# Patient Record
Sex: FEMALE | Race: WHITE
[De-identification: names, ages, dates, MRNs, and addresses within clinical notes are randomized per-mention and may not be internally consistent; named-entity substitution may affect disease eponyms.]

---

## 2018-06-20 ENCOUNTER — HOSPITAL ENCOUNTER (OUTPATIENT)
Dept: HOSPITAL 89 - RESP | Age: 78
End: 2018-06-20
Attending: INTERNAL MEDICINE
Payer: MEDICARE

## 2018-06-20 VITALS — BODY MASS INDEX: 25.24 KG/M2

## 2018-06-20 DIAGNOSIS — J45.20: Primary | ICD-10-CM

## 2018-06-20 DIAGNOSIS — R09.02: ICD-10-CM

## 2018-06-20 PROCEDURE — 94060 EVALUATION OF WHEEZING: CPT

## 2018-06-20 PROCEDURE — 94729 DIFFUSING CAPACITY: CPT

## 2018-06-20 PROCEDURE — 94726 PLETHYSMOGRAPHY LUNG VOLUMES: CPT

## 2018-07-11 ENCOUNTER — HOSPITAL ENCOUNTER (OUTPATIENT)
Dept: HOSPITAL 89 - RESP | Age: 78
End: 2018-07-11
Attending: INTERNAL MEDICINE
Payer: MEDICARE

## 2018-07-11 VITALS — BODY MASS INDEX: 25.24 KG/M2

## 2018-07-11 DIAGNOSIS — G47.30: Primary | ICD-10-CM

## 2018-07-11 DIAGNOSIS — G47.36: ICD-10-CM

## 2018-10-02 ENCOUNTER — HOSPITAL ENCOUNTER (OUTPATIENT)
Dept: HOSPITAL 89 - CARD | Age: 78
LOS: 6 days | End: 2018-10-08
Attending: INTERNAL MEDICINE
Payer: MEDICARE

## 2018-10-02 VITALS — BODY MASS INDEX: 25.24 KG/M2

## 2018-10-02 DIAGNOSIS — R09.02: ICD-10-CM

## 2018-10-02 DIAGNOSIS — J45.20: Primary | ICD-10-CM

## 2018-10-02 PROCEDURE — 94618 PULMONARY STRESS TESTING: CPT

## 2018-10-02 PROCEDURE — 94667 MNPJ CHEST WALL 1ST: CPT

## 2019-01-08 ENCOUNTER — HOSPITAL ENCOUNTER (OUTPATIENT)
Dept: HOSPITAL 89 - LAB | Age: 79
End: 2019-01-08
Attending: SURGERY
Payer: MEDICARE

## 2019-01-08 VITALS — BODY MASS INDEX: 25.24 KG/M2

## 2019-01-08 DIAGNOSIS — D50.9: ICD-10-CM

## 2019-01-08 DIAGNOSIS — I48.91: Primary | ICD-10-CM

## 2019-01-08 LAB — PLATELET COUNT, AUTOMATED: 285 K/UL (ref 150–450)

## 2019-01-08 PROCEDURE — 82310 ASSAY OF CALCIUM: CPT

## 2019-01-08 PROCEDURE — 82374 ASSAY BLOOD CARBON DIOXIDE: CPT

## 2019-01-08 PROCEDURE — 85025 COMPLETE CBC W/AUTO DIFF WBC: CPT

## 2019-01-08 PROCEDURE — 84132 ASSAY OF SERUM POTASSIUM: CPT

## 2019-01-08 PROCEDURE — 82947 ASSAY GLUCOSE BLOOD QUANT: CPT

## 2019-01-08 PROCEDURE — 82565 ASSAY OF CREATININE: CPT

## 2019-01-08 PROCEDURE — 36415 COLL VENOUS BLD VENIPUNCTURE: CPT

## 2019-01-08 PROCEDURE — 84295 ASSAY OF SERUM SODIUM: CPT

## 2019-01-08 PROCEDURE — 84520 ASSAY OF UREA NITROGEN: CPT

## 2019-01-08 PROCEDURE — 82435 ASSAY OF BLOOD CHLORIDE: CPT

## 2019-01-09 NOTE — EKG
FACILITY: [facility] 

 

PATIENT NAME: KHUSHBU POON

: [id number]

MR: [id number]

V: [id number]

EXAM DATE: 

ORDERING PHYSICIAN: ELIAS ARIAS

TECHNOLOGIST: TOVA

 

Test Reason : SURGERY CLEARANCE

Blood Pressure : ***/*** mmHG

Vent. Rate : 081 BPM     Atrial Rate : 081 BPM

   P-R Int : 208 ms          QRS Dur : 084 ms

    QT Int : 388 ms       P-R-T Axes : 067 -12 061 degrees

   QTc Int : 450 ms

 

Normal sinus rhythm

Normal ECG

When compared with ECG of 31-OCT-2017 05:45,

Criteria for Septal infarct are no longer present

Borderline criteria for Lateral infarct are no longer present

Nonspecific T wave abnormality no longer evident in Anterior leads

Confirmed by HOMA SALOMON ([id number]) on 1/10/2019 1:48:17 PM

 

Referred By:  HUGO           Confirmed By:HOMA SALOMON

## 2019-02-05 ENCOUNTER — HOSPITAL ENCOUNTER (OUTPATIENT)
Dept: HOSPITAL 89 - OR | Age: 79
Discharge: HOME | End: 2019-02-05
Attending: SURGERY
Payer: MEDICARE

## 2019-02-05 VITALS — DIASTOLIC BLOOD PRESSURE: 79 MMHG | SYSTOLIC BLOOD PRESSURE: 150 MMHG

## 2019-02-05 VITALS — SYSTOLIC BLOOD PRESSURE: 128 MMHG | DIASTOLIC BLOOD PRESSURE: 92 MMHG

## 2019-02-05 VITALS — DIASTOLIC BLOOD PRESSURE: 77 MMHG | SYSTOLIC BLOOD PRESSURE: 145 MMHG

## 2019-02-05 VITALS — SYSTOLIC BLOOD PRESSURE: 119 MMHG | DIASTOLIC BLOOD PRESSURE: 57 MMHG

## 2019-02-05 VITALS — DIASTOLIC BLOOD PRESSURE: 71 MMHG | SYSTOLIC BLOOD PRESSURE: 137 MMHG

## 2019-02-05 VITALS — SYSTOLIC BLOOD PRESSURE: 153 MMHG | DIASTOLIC BLOOD PRESSURE: 83 MMHG

## 2019-02-05 VITALS — DIASTOLIC BLOOD PRESSURE: 71 MMHG | SYSTOLIC BLOOD PRESSURE: 130 MMHG

## 2019-02-05 VITALS
WEIGHT: 139 LBS | HEIGHT: 62 IN | WEIGHT: 139 LBS | HEIGHT: 62 IN | BODY MASS INDEX: 25.58 KG/M2 | BODY MASS INDEX: 25.58 KG/M2

## 2019-02-05 VITALS — SYSTOLIC BLOOD PRESSURE: 125 MMHG | DIASTOLIC BLOOD PRESSURE: 63 MMHG

## 2019-02-05 DIAGNOSIS — D12.3: ICD-10-CM

## 2019-02-05 DIAGNOSIS — K44.9: ICD-10-CM

## 2019-02-05 DIAGNOSIS — K57.30: ICD-10-CM

## 2019-02-05 DIAGNOSIS — K29.70: Primary | ICD-10-CM

## 2019-02-05 DIAGNOSIS — D64.9: ICD-10-CM

## 2019-02-05 PROCEDURE — 88344 IMHCHEM/IMCYTCHM EA MLT ANTB: CPT

## 2019-02-05 PROCEDURE — 45380 COLONOSCOPY AND BIOPSY: CPT

## 2019-02-05 PROCEDURE — 87077 CULTURE AEROBIC IDENTIFY: CPT

## 2019-02-05 PROCEDURE — 88305 TISSUE EXAM BY PATHOLOGIST: CPT

## 2019-02-05 PROCEDURE — 88313 SPECIAL STAINS GROUP 2: CPT

## 2019-02-05 PROCEDURE — 43245 EGD DILATE STRICTURE: CPT

## 2019-02-05 PROCEDURE — 43239 EGD BIOPSY SINGLE/MULTIPLE: CPT

## 2019-02-05 PROCEDURE — 00813 ANES UPR LWR GI NDSC PX: CPT

## 2019-02-05 NOTE — SHORT(OUTPT) DISCHARGE SUMMARY
Discharge Summary


Reason for Hosp/Final Diag:  


(1) Iron deficiency anemia


Hospital Course & Plan:  79 yo f presented for egd and colonoscopy. she 


tolerated the procedures well. no complications. path pending. she will be 


discharged home when criteria met. 





Departure


Discharge to:  Home





Discharge Instructions


Home Meds


Active Scripts


Ferrous Sulfate (IRON) 325 Mg Tablet, 1 CAP PO QDAY, #60 TAB 2 Refills


   Prov:PRO CALI MD         1/2/19


Montelukast Sodium (SINGULAIR) 10 Mg Tablet, 1 TAB PO QDAY, #90 TAB 2 Refills


   Prov:PRO CALI MD         8/7/18


Diltiazem Hcl (DILTIAZEM 24HR CD) 300 Mg Cap.er.24h, 1 CAP PO QDAY, #90 CAP 3 


Refills


   Prov:PRO CALI MD         6/14/18


Albuterol Sulfate 90 Mcg/Act (PROAIR HFA 90 MCG/ACT) 8.5 Gm Hfa.aer.ad, 1-2 PUFF


IH 3-4XD PRN for shortness of breath, #3 CART 4 Refills


   Prov:PRO ACLI MD         6/14/18


Atorvastatin Calcium (LIPITOR) 10 Mg Tablet, 1 TAB PO QDAY, #90 TAB 3 Refills


   Prov:PRO CALI MD         2/21/18


Rivaroxaban 20 Mg (XARELTO 20 MG) 20 Mg Tablet, 1 TAB PO QDAY, #90 TAB 3 Refills


   Prov:PRO CALI MD         2/21/18


Reported Medications


Oxygen (OXYGEN)  Inha, 2 L INH cont,  


   7/19/18


Ketoconazole (KETOCONAZOLE) 15 Gm Cream..g., 15 GM TP BID, #15 TUBE


   12/4/17


Omeprazole (OMEPRAZOLE) 20 Mg Capsule.dr, 1 CAP PO QDAY, CAP


   11/8/17


Fexofenadine Hcl (ALLEGRA ALLERGY) 180 Mg Tablet, 1 TAB-CAP PO QDAY, TAB


   8/1/16


Diphenhydramine Hcl (DIPHENHYDRAMINE HCL) 25 Mg Tablet, 1 TAB PO QHS, TAB


   5/2/16


Triamcinolone Acetonide (Nasacort) 10.8 Ml Spray, 1 SPR NA QDAY PRN for allergy 


symptoms


   10/22/15


Cranberry Fruit (CRANBERRY) 475 Mg Capsule, 1 CAP PO QDAY, CAPSULE


   10/22/15


Calcium Carb & Cit/Vitamin D3 (CALCIUM + D3 ER TABLET) 1 Each Tablet.er, 1 TAB 


PO QDAY


   9/12/14


Flaxseed (FLAXSEED OIL) 1,000 Mg Capsule, 1 CAP PO QDAY, CAPSULE


   9/12/14


Diet:  Regular


Activity:  As Tolerated


Special Instructions:  


we will call you in 7-10 days with biopsy results.











ELIAS ARIAS                   Feb 5, 2019 08:57

## 2019-04-12 ENCOUNTER — HOSPITAL ENCOUNTER (OUTPATIENT)
Dept: HOSPITAL 89 - LAB | Age: 79
End: 2019-04-12
Attending: NURSE PRACTITIONER
Payer: MEDICARE

## 2019-04-12 VITALS — BODY MASS INDEX: 25.24 KG/M2

## 2019-04-12 DIAGNOSIS — D50.9: Primary | ICD-10-CM

## 2019-04-12 LAB — PLATELET COUNT, AUTOMATED: 250 K/UL (ref 150–450)

## 2019-04-12 PROCEDURE — 82728 ASSAY OF FERRITIN: CPT

## 2019-04-12 PROCEDURE — 85025 COMPLETE CBC W/AUTO DIFF WBC: CPT

## 2019-04-12 PROCEDURE — 83550 IRON BINDING TEST: CPT

## 2019-04-12 PROCEDURE — 36415 COLL VENOUS BLD VENIPUNCTURE: CPT

## 2019-04-12 PROCEDURE — 83540 ASSAY OF IRON: CPT

## 2019-04-17 ENCOUNTER — HOSPITAL ENCOUNTER (EMERGENCY)
Dept: HOSPITAL 89 - ER | Age: 79
Discharge: HOME | End: 2019-04-17
Payer: MEDICARE

## 2019-04-17 VITALS — BODY MASS INDEX: 25.24 KG/M2 | WEIGHT: 139 LBS

## 2019-04-17 VITALS — DIASTOLIC BLOOD PRESSURE: 60 MMHG | SYSTOLIC BLOOD PRESSURE: 144 MMHG

## 2019-04-17 DIAGNOSIS — R05: ICD-10-CM

## 2019-04-17 DIAGNOSIS — J20.9: Primary | ICD-10-CM

## 2019-04-17 LAB — PLATELET COUNT, AUTOMATED: 214 K/UL (ref 150–450)

## 2019-04-17 PROCEDURE — 82374 ASSAY BLOOD CARBON DIOXIDE: CPT

## 2019-04-17 PROCEDURE — 71046 X-RAY EXAM CHEST 2 VIEWS: CPT

## 2019-04-17 PROCEDURE — 84520 ASSAY OF UREA NITROGEN: CPT

## 2019-04-17 PROCEDURE — 82247 BILIRUBIN TOTAL: CPT

## 2019-04-17 PROCEDURE — 84460 ALANINE AMINO (ALT) (SGPT): CPT

## 2019-04-17 PROCEDURE — 93005 ELECTROCARDIOGRAM TRACING: CPT

## 2019-04-17 PROCEDURE — 94640 AIRWAY INHALATION TREATMENT: CPT

## 2019-04-17 PROCEDURE — 82947 ASSAY GLUCOSE BLOOD QUANT: CPT

## 2019-04-17 PROCEDURE — 99284 EMERGENCY DEPT VISIT MOD MDM: CPT

## 2019-04-17 PROCEDURE — 96374 THER/PROPH/DIAG INJ IV PUSH: CPT

## 2019-04-17 PROCEDURE — 82565 ASSAY OF CREATININE: CPT

## 2019-04-17 PROCEDURE — 84132 ASSAY OF SERUM POTASSIUM: CPT

## 2019-04-17 PROCEDURE — 82310 ASSAY OF CALCIUM: CPT

## 2019-04-17 PROCEDURE — 84484 ASSAY OF TROPONIN QUANT: CPT

## 2019-04-17 PROCEDURE — 83605 ASSAY OF LACTIC ACID: CPT

## 2019-04-17 PROCEDURE — 82040 ASSAY OF SERUM ALBUMIN: CPT

## 2019-04-17 PROCEDURE — 85025 COMPLETE CBC W/AUTO DIFF WBC: CPT

## 2019-04-17 PROCEDURE — 84155 ASSAY OF PROTEIN SERUM: CPT

## 2019-04-17 PROCEDURE — 83880 ASSAY OF NATRIURETIC PEPTIDE: CPT

## 2019-04-17 PROCEDURE — 84295 ASSAY OF SERUM SODIUM: CPT

## 2019-04-17 PROCEDURE — 84075 ASSAY ALKALINE PHOSPHATASE: CPT

## 2019-04-17 PROCEDURE — 84450 TRANSFERASE (AST) (SGOT): CPT

## 2019-04-17 PROCEDURE — 82435 ASSAY OF BLOOD CHLORIDE: CPT

## 2019-04-17 NOTE — EKG
FACILITY: [facility] 

 

PATIENT NAME: KHUSHBU POON

: [id number]

MR: [id number]

V: [id number]

EXAM DATE: 

ORDERING PHYSICIAN: JONATHAN CASTELLANOS

TECHNOLOGIST: YUE

 

Test Reason : DYSPNEA

Blood Pressure : ***/*** mmHG

Vent. Rate : 064 BPM     Atrial Rate : 064 BPM

   P-R Int : 208 ms          QRS Dur : 082 ms

    QT Int : 428 ms       P-R-T Axes : 057 -19 028 degrees

   QTc Int : 441 ms

 

Sinus rhythm

Septal infarct , age undetermined

Abnormal ECG

When compared with ECG of 2019 15:17,

Septal infarct is now present

Confirmed by KATHY MENJIVAR ([id number]) on 2019 6:46:17 AM

 

Referred By:             Confirmed By:KATHY MENJIVAR

## 2019-04-17 NOTE — RADIOLOGY IMAGING REPORT
FACILITY: [facility] 

 

PATIENT NAME: Tanna Saavedra

: 1940

MR: [id number]

V: [id number]

EXAM DATE: 

ORDERING PHYSICIAN: JONATHAN CASTELLANOS

TECHNOLOGIST: 

 

Location: [facility]

Patient: Tanna Saavedra

: 1940

MRN: [id number]

Visit/Account:[id number]

Date of Sevice:  2019

 

ACCESSION #: [id number]

 

TWO VIEW CHEST 2019 4:03 AM.

 

INDICATION: Respiratory distress.

 

COMPARISON: 10/31/2017.

 

FINDINGS: Lungs are well-expanded.  The lungs are clear.  No pneumothorax or pleural effusion.  Borde
rline enlargement of cardiac silhouette.

 

IMPRESSION: Borderline enlargement of the cardiac silhouette, otherwise nonacute.

 

Report Dictated By: Hermann Ren MD at 2019 5:15 AM

 

Report E-Signed By: Hermann Ren MD  at 2019 5:16 AM

 

WSN:[id number]

## 2019-04-17 NOTE — ER REPORT
History and Physical


Time Seen By MD:  03:40


HPI/ROS


CHIEF COMPLAINT: Cough, shortness of breath





HISTORY OF PRESENT ILLNESS: 79-year-old female presents to the ER complaining of


persistent cough.  Patient was seen by her primary care physician 5 days ago and


diagnosed with viral URI after 24 hours of symptoms.  Her symptoms have 


persisted and become much worse.  Tonight she is having coughing fits and unable


to catch her breath.  She's even feeling a little syncopal but has not passed 


out.  She notes no fever.  She's been having some productive cough of yellow 


sputum.  She's been taking significant over-the-counter medications to suppress 


her cough without improvement.  She's been eating cough drops to excess.  


Patient notes no chest pain or leg swelling.  Patient's been using her albuterol


inhaler without improvement.  She has Symbicort, but has been not been taking 


it.  Patient has history of allergies and asthma.





REVIEW OF SYSTEMS:


Respiratory: As above


Cardiovascular: No chest pain, no palpitations.


Gastrointestinal: No vomiting, no abdominal pain.


Musculoskeletal: No back pain.


Allergies:  


Coded Allergies:  


     adhesive tape (Verified  Allergy, Mild, 19)


     codeine (Verified  Allergy, Mild, RASH, 19)


     erythromycin base (Verified  Allergy, Mild, RASH, 19)


     penicillin G (Verified  Allergy, Mild, RASH, 19)


     BEE STINGS (Unverified  Allergy, Unknown, hives, 19)


     Iodinated Contrast- Oral and IV Dye (Unverified  Allergy, Unknown, sea food


allergy, 19)


Uncoded Allergies:  


     PAIN MEDICATIONS (Allergy, Mild, RASH, 07)


     SEAFOOD (Allergy, Mild, HIVES, 07)


Home Meds


Active Scripts


Cefuroxime Axetil (CEFUROXIME) 500 Mg Tablet, 500 MG PO BID for infection, #14 


TAB


   Prov:JONATHAN CASTELLANOS DO         19


Benzonatate 100 Mg Cap (TESSALON PERLE 100 MG CAP) 100 Mg Capsule, 100 MG PO TID


PRN for cough suppression, #20 CAP


   Prov:JONATHAN CASTELLANOS DO         19


Atorvastatin Calcium (LIPITOR) 10 Mg Tablet, 1 TAB PO QDAY, #90 TAB 0 Refills


   Prov:SYLVIA AMBROSE FNP-C         19


Rivaroxaban 20 Mg (XARELTO 20 MG) 20 Mg Tablet, 1 TAB PO QDAY, #90 TAB 0 Refills


   Prov:HALIESYLVIA KEITH LEE-C         19


Ferrous Sulfate (IRON) 325 Mg Tablet, 1 CAP PO QDAY, #60 TAB 2 Refills


   Prov:PRO CALI MD         19


Montelukast Sodium (SINGULAIR) 10 Mg Tablet, 1 TAB PO QDAY, #90 TAB 2 Refills


   Prov:PRO CALI MD         18


Diltiazem Hcl (DILTIAZEM 24HR CD) 300 Mg Cap.er.24h, 1 CAP PO QDAY, #90 CAP 3 


Refills


   Prov:PRO CALI MD         18


Albuterol Sulfate 90 Mcg/Act (PROAIR HFA 90 MCG/ACT) 8.5 Gm Hfa.aer.ad, 1-2 PUFF


IH 3-4XD PRN for shortness of breath, #3 CART 4 Refills


   Prov:PRO CALI MD         18


Reported Medications


Oxygen (OXYGEN)  Inha, 2 L INH cont,  


   18


Ketoconazole (KETOCONAZOLE) 15 Gm Cream..g., 15 GM TP BID, #15 TUBE


   17


Omeprazole (OMEPRAZOLE) 20 Mg Capsule.dr, 1 CAP PO QDAY, CAP


   17


Fexofenadine Hcl (ALLEGRA ALLERGY) 180 Mg Tablet, 1 TAB-CAP PO QDAY, TAB


   16


Diphenhydramine Hcl (DIPHENHYDRAMINE HCL) 25 Mg Tablet, 1 TAB PO QHS, TAB


   16


Triamcinolone Acetonide (Nasacort) 10.8 Ml Spray, 1 SPR NA QDAY PRN for allergy 


symptoms


   10/22/15


Cranberry Fruit (CRANBERRY) 475 Mg Capsule, 1 CAP PO QDAY, CAPSULE


   10/22/15


Calcium Carb & Cit/Vitamin D3 (CALCIUM + D3 ER TABLET) 1 Each Tablet.er, 1 TAB 


PO QDAY


   14


Flaxseed (FLAXSEED OIL) 1,000 Mg Capsule, 1 CAP PO QDAY, CAPSULE


   14


Past Medical/Surgical History


Past Medical History








Cardiovascular:   Reports hx of: cardiac arrhythmias (paroxysmal atrial fib/ 


flutter dx'd 10/16)





  hyperlipidemia














Respiratory:   Reports hx of: other respiratory history (hypoxemia without 


specific cause identified)





  





 Denies hx of: asthma (thought to have in the past, but does not)














Genitourinary:   Reports hx of: urinary tract infection (recurrent)














Musculoskeletal:   Reports hx of: osteopenia














Hematology/oncology:   Reprots hx of: breast cancer ( - lumpectomy, 


radiation, and tamoxifen X 5 yrs)











Past Surgical History








HEENT:   Reports hx of: cataract extraction (BILAT , )














Gynecologic:   Reports hx of: hysterectomy ( - endometriosis)














Breast:   Reports hx of: lumpectomy (breast ca , followed by radiation and 


tamoxifen X 5 yrs)














Integumentary:   Reports hx of: skin cancer removal (BCC X3, SCC NOSE)








Reviewed Nurses Notes:  Yes


Old Medical Records Reviewed:  Yes


Hx Smoking:  No


Smoking Status:  Never Smoker


Exposure to Second Hand Smoke?:  Yes


Hx Alcohol Use:  Yes


Constitutional





Vital Sign - Last 24 Hours








 19





 03:46 03:46 03:52 04:00


 


Temp 98.6   


 


Pulse 73  65 


 


Resp 16   


 


B/P (MAP) 137/77 137/77 (97)  138/63 (88)


 


Pulse Ox 90  93 


 


O2 Delivery Room Air   


 


    





 19





 04:07 04:20 04:20 04:20


 


Pulse 64 69  


 


Resp  16  


 


Pulse Ox 95   92


 


O2 Delivery    Nasal Cannula


 


O2 Flow Rate   3.0 3.0





 19





 04:22 04:30 04:41 05:00


 


Pulse 68 67  


 


B/P (MAP)   158/63 (94) 138/62 (87)


 


Pulse Ox 98 92  





 19 





 05:05 05:30 05:35 


 


Pulse 67  68 


 


B/P (MAP)  144/60 (88)  


 


Pulse Ox 93  91 








Physical Exam


  General Appearance: patient is alert, has no immediate need for airway 


protection and no current signs of toxicity.  Vital signs stable, afebrile, 


pulse ox normal


HEENT: Pupils equal and round no injection.  TMs normal, oropharynx with 


moderate erythema, no exudate


Respiratory: Chest is non tender, lungs are clear to auscultation.  No wheezing 


or rails


Cardiac: regular rate and rhythm


Gastrointestinal: Abdomen is soft and non tender, no masses, bowel sounds 


normal.


Musculoskeletal:  Neck: Neck is supple and non tender.


Extremities have full range of motion and are non tender.  No edema


Skin: No rashes or lesions.





DIFFERENTIAL DIAGNOSIS: After history and physical exam differential diagnosis 


was considered for shortness of breath including but not limited to pulmonary 


infectious process, COPD, asthma, pulmonary embolus, bronchospasm, GERD and 


congestive heart failure.





Medical Decision Making


Data Points


Result Diagram:  


19 0415                                                                    


           19 0415





Laboratory





Hematology








Test


 19


04:15


 


Red Blood Count


 4.56 M/uL


(4.17-5.56)


 


Mean Corpuscular Volume


 85.7 fL


(80.0-96.0)


 


Mean Corpuscular Hemoglobin


 29.5 pg


(26.0-33.0)


 


Mean Corpuscular Hemoglobin


Concent 34.4 g/dL


(32.0-36.0)


 


Red Cell Distribution Width


 14.7 %


(11.5-14.5)


 


Mean Platelet Volume


 6.6 fL


(7.2-11.1)


 


Neutrophils (%) (Auto)


 65.2 %


(39.4-72.5)


 


Lymphocytes (%) (Auto)


 15.5 %


(17.6-49.6)


 


Monocytes (%) (Auto)


 11.2 %


(4.1-12.4)


 


Eosinophils (%) (Auto)


 7.3 %


(0.4-6.7)


 


Basophils (%) (Auto)


 0.8 %


(0.3-1.4)


 


Nucleated RBC Relative Count


(auto) 0.0 /100WBC 





 


Neutrophils # (Auto)


 3.4 K/uL


(2.0-7.4)


 


Lymphocytes # (Auto)


 0.8 K/uL


(1.3-3.6)


 


Monocytes # (Auto)


 0.6 K/uL


(0.3-1.0)


 


Eosinophils # (Auto)


 0.4 K/uL


(0.0-0.5)


 


Basophils # (Auto)


 0.0 K/uL


(0.0-0.1)


 


Nucleated RBC Absolute Count


(auto) 0.00 K/uL 





 


Sodium Level


 135 mmol/L


(137-145)


 


Potassium Level


 3.7 mmol/L


(3.5-5.0)


 


Chloride Level


 99 mmol/L


()


 


Carbon Dioxide Level


 27 mmol/L


(22-31)


 


Blood Urea Nitrogen 9 mg/dl (7-18) 


 


Creatinine


 0.70 mg/dl


(0.52-1.04)


 


Glomerular Filtration Rate


Calc > 60.0 





 


Random Glucose


 169 mg/dl


()


 


Lactate


 1.6 mmol/L


(0.7-2.1)


 


Calcium Level


 8.8 mg/dl


(8.4-10.2)


 


Total Bilirubin


 0.3 mg/dl


(0.2-1.3)


 


Aspartate Amino Transf


(AST/SGOT) 19 U/L (0-35) 





 


Alanine Aminotransferase


(ALT/SGPT) 25 U/L (0-56) 





 


Alkaline Phosphatase 73 U/L (0-126) 


 


Troponin I < 0.012 ng/ml 


 


B-Type Natriuretic Peptide


 8 pg/ml


(0-100)


 


Total Protein


 6.4 g/dl


(6.3-8.2)


 


Albumin


 4.2 g/dl


(3.5-5.0)








Chemistry








Test


 19


04:15


 


White Blood Count


 5.2 k/uL


(4.5-11.0)


 


Red Blood Count


 4.56 M/uL


(4.17-5.56)


 


Hemoglobin


 13.4 g/dL


(12.0-16.0)


 


Hematocrit


 39.0 %


(34.0-47.0)


 


Mean Corpuscular Volume


 85.7 fL


(80.0-96.0)


 


Mean Corpuscular Hemoglobin


 29.5 pg


(26.0-33.0)


 


Mean Corpuscular Hemoglobin


Concent 34.4 g/dL


(32.0-36.0)


 


Red Cell Distribution Width


 14.7 %


(11.5-14.5)


 


Platelet Count


 214 K/uL


(150-450)


 


Mean Platelet Volume


 6.6 fL


(7.2-11.1)


 


Neutrophils (%) (Auto)


 65.2 %


(39.4-72.5)


 


Lymphocytes (%) (Auto)


 15.5 %


(17.6-49.6)


 


Monocytes (%) (Auto)


 11.2 %


(4.1-12.4)


 


Eosinophils (%) (Auto)


 7.3 %


(0.4-6.7)


 


Basophils (%) (Auto)


 0.8 %


(0.3-1.4)


 


Nucleated RBC Relative Count


(auto) 0.0 /100WBC 





 


Neutrophils # (Auto)


 3.4 K/uL


(2.0-7.4)


 


Lymphocytes # (Auto)


 0.8 K/uL


(1.3-3.6)


 


Monocytes # (Auto)


 0.6 K/uL


(0.3-1.0)


 


Eosinophils # (Auto)


 0.4 K/uL


(0.0-0.5)


 


Basophils # (Auto)


 0.0 K/uL


(0.0-0.1)


 


Nucleated RBC Absolute Count


(auto) 0.00 K/uL 





 


Glomerular Filtration Rate


Calc > 60.0 





 


Lactate


 1.6 mmol/L


(0.7-2.1)


 


Calcium Level


 8.8 mg/dl


(8.4-10.2)


 


Total Bilirubin


 0.3 mg/dl


(0.2-1.3)


 


Aspartate Amino Transf


(AST/SGOT) 19 U/L (0-35) 





 


Alanine Aminotransferase


(ALT/SGPT) 25 U/L (0-56) 





 


Alkaline Phosphatase 73 U/L (0-126) 


 


Troponin I < 0.012 ng/ml 


 


B-Type Natriuretic Peptide


 8 pg/ml


(0-100)


 


Total Protein


 6.4 g/dl


(6.3-8.2)


 


Albumin


 4.2 g/dl


(3.5-5.0)











EKG/Imaging


EKG Interpretation


12 lead EK


      Rhythm: normal sinus rhythm


      Axis: normal 


      QRS: normal, ? old anterior Q waves,


      ST segments: normal, no evidence of ischemia or dysrhythmia.  No 


significant change compared to previous EKG 19


Imaging


X-ray: Two-view chest x-ray was obtained.  I viewed the images myself on the 


PACS system.  My interpretation of the images is: No infiltrate,?  Retrocardiac 


infiltrate noted on lateral view.  The radiologist interpretation had no 


clinically significant variation from this interpretation.





ED Course/Re-evaluation


Clinical Indication for ER IV:  IV Access


ED Course


Patient was admitted to an examination room.  H&P was done.  The differential 


diagnoses was considered.  On clinical examination.  Patient with repetitive 


coughing.  Her vital signs are stable.  She's been having coughing fits and near


syncope.  She is also severely short of breath.  She is unable to catch her 


breath due to the repetitive coughing.  Patient was seen by her primary care 5 


days ago and diagnosed with viral URI.  She's continued to get worse.  She's 


been using her albuterol inhaler with no improvement.  Patient notes occasional 


productive cough of yellow sputum.  She notes no fevers.  Patient's treated 


without DuoNeb mixed with lidocaine 1% 2 mL.  Which significantly improved her 


coughing.  Patient's given Solu-Medrol 125 mg IV.  Her diagnostic laboratory 


studies are unremarkable.  She has a normal white blood cell count without left 


shift.  Chest x-ray shows no obvious infiltrate.  I am concerned there may be a 


retrocardiac infiltrate on the lateral view.  Patient will be covered with 


Ceftin 500 mg by mouth twice a day.  She states she's taken Ceftin before.  She 


has allergies to erythromycin and amoxicillin.  Patient's advised to follow-up 


with her primary care if unimproved in 3-5 days.  She's given a prescription for


Tessalon for her cough suppression.  Also recommended to take Robitussin-DM.


Decision to Disposition Date:  2019


Decision to Disposition Time:  04:53





Depart


Departure


Latest Vital Signs





Vital Signs








  Date Time  Temp Pulse Resp B/P (MAP) Pulse Ox O2 Delivery O2 Flow Rate FiO2


 


19 05:35  68   91   


 


19 05:30    144/60 (88)    


 


19 04:20      Nasal Cannula 3.0 


 


19 04:20   16     


 


19 03:46 98.6       








Impression:  


   Primary Impression:  


   Acute bronchitis


   Additional Impression:  


   Persistent cough


Condition:  Improved


Disposition:  HOME OR SELF-CARE


Referrals:  


ARNIE RODRIGUEZ MD (PCP)


New Scripts


Cefuroxime Axetil (CEFUROXIME) 500 Mg Tablet


500 MG PO BID for infection, #14 TAB


   Prov: JONATHAN CASTELLANOS DO         19 


Benzonatate 100 Mg Cap (TESSALON PERLE 100 MG CAP) 100 Mg Capsule


100 MG PO TID PRN for cough suppression, #20 CAP


   Prov: JONATHAN CASTELLANOS DO         19


Patient Instructions:  Acute Bronchitis (ED)





Additional Instructions:  


Follow-up with primary care if unimproved in 3-4 days





Problem Qualifiers








   Primary Impression:  


   Acute bronchitis


   Bronchitis organism:  unspecified organism  Qualified Codes:  J20.9 - Acute 


   bronchitis, unspecified








JONATHAN CASTELLANOS DO              2019 03:40

## 2019-08-09 ENCOUNTER — HOSPITAL ENCOUNTER (OUTPATIENT)
Dept: HOSPITAL 89 - CT | Age: 79
End: 2019-08-09
Attending: INTERNAL MEDICINE
Payer: MEDICARE

## 2019-08-09 VITALS — BODY MASS INDEX: 25.24 KG/M2

## 2019-08-09 DIAGNOSIS — R91.1: Primary | ICD-10-CM

## 2019-08-09 PROCEDURE — 71250 CT THORAX DX C-: CPT

## 2019-08-09 NOTE — RADIOLOGY IMAGING REPORT
FACILITY: [facility] [place] 

 

PATIENT NAME: Tanna Saavedra

: 1940

MR: [id number]

V: [id number]

EXAM DATE: 

ORDERING PHYSICIAN: NAVID MOODY

TECHNOLOGIST: 

 

Location: [facility] [place]

Patient: Tanna Saavedra

: 1940

MRN: [id number]

Visit/Account:[id number]

Date of Sevice:  2019

 

ACCESSION #: [id number]

 

CT CHEST W/O CONTRAST

 

History:  Pulmonary nodule

 

 

TECHNIQUE:   Contiguous axial images were performed through the chest to the level of the adrenal gla
nds. No IV contrast was administered. Coronal and sagittal reformatting was also performed.Dose Lower
ing Technique

 

One of the following dose optimization techniques was utilized in the performance of this exam: Autom
ated exposure control; adjustment of the mA and/or kV according to the patient's size; or use of an i
terative  reconstruction technique.  Specific details can be referenced in the facility's radiology C
T exam operational policy.

 

COMPARISON STUDIES:   Two-view chest 2019.

 

Lungs / Pleura:   There is a 2 mm calcified granuloma in the anterior right upper lobe best appreciat
ed on image 55 of series 4.  There is linear stranding in the lower lobes and lingula consistent with
 scarring versus platelike atelectasis

 

Mediastinum/nodes:   negative.

 

Heart and vessels:  There are very mild calcifications in the thoracic aorta and coronary arteries

 

Musculoskeletal / Body wall:   negative.

 

Upper abdomen:  There Is a tiny hiatal hernia

 

IMPRESSION:

 

Is a 2 mm calcified granuloma in the anterior right upper lobe.

 

Linear stranding in the lower lobes and lingula consistent with scarring versus platelike atelectasis


 

Tiny hiatal hernia

 

Report Dictated By: Freda Rogel MD at 2019 2:33 PM

 

Report E-Signed By: Freda Rogel MD  at 2019 2:38 PM

 

WSN:[id number]

## 2019-08-14 ENCOUNTER — HOSPITAL ENCOUNTER (OUTPATIENT)
Dept: HOSPITAL 89 - LAB | Age: 79
End: 2019-08-14
Attending: FAMILY MEDICINE
Payer: MEDICARE

## 2019-08-14 VITALS — BODY MASS INDEX: 25.24 KG/M2

## 2019-08-14 DIAGNOSIS — E78.5: Primary | ICD-10-CM

## 2019-08-14 DIAGNOSIS — I48.91: ICD-10-CM

## 2019-08-14 LAB — PLATELET COUNT, AUTOMATED: 283 K/UL (ref 150–450)

## 2019-08-14 PROCEDURE — 82565 ASSAY OF CREATININE: CPT

## 2019-08-14 PROCEDURE — 82947 ASSAY GLUCOSE BLOOD QUANT: CPT

## 2019-08-14 PROCEDURE — 82435 ASSAY OF BLOOD CHLORIDE: CPT

## 2019-08-14 PROCEDURE — 84295 ASSAY OF SERUM SODIUM: CPT

## 2019-08-14 PROCEDURE — 36415 COLL VENOUS BLD VENIPUNCTURE: CPT

## 2019-08-14 PROCEDURE — 82374 ASSAY BLOOD CARBON DIOXIDE: CPT

## 2019-08-14 PROCEDURE — 82310 ASSAY OF CALCIUM: CPT

## 2019-08-14 PROCEDURE — 85025 COMPLETE CBC W/AUTO DIFF WBC: CPT

## 2019-08-14 PROCEDURE — 84132 ASSAY OF SERUM POTASSIUM: CPT

## 2019-08-14 PROCEDURE — 84520 ASSAY OF UREA NITROGEN: CPT
